# Patient Record
(demographics unavailable — no encounter records)

---

## 2025-04-07 NOTE — ASSESSMENT
[FreeTextEntry1] : GERD GI eval and manage symptoms of [employer] covered condition of   GERD COLO and EGD if indicated for polyps  [facility] covers colon cancer screening once every 10 years unless as a diagnostic  Patient c/o bloating, feeling nauseous after eating, hungry after eating  patient is aware that diverticulitis is not covered
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
## 2025-04-07 NOTE — HISTORY OF PRESENT ILLNESS
[FreeTextEntry1] :     HPI 62 y M presents to [facility] for his follow up  he is cert for Chronic GERD  GERD-  pt reports to have constant heartburn and regurgitation.  Patient c/o bloating, feeling nauseous after eating, hungry after eating  He was seen by his GI and placed on pantoprazole 40 mg twice a day that somewhat helped him but he still wakes up with clearing his throat and heartburn. He would like to be seen by GI  had colonoscopy and EGD 2019- no BE noted on EGD he had some polyps and is due back this year for COLO

## 2025-04-07 NOTE — REVIEW OF SYSTEMS
[Earache] : no earache [Loss Of Hearing] : no hearing loss [Chest Pain] : no chest pain [Palpitations] : no palpitations [Shortness Of Breath] : no shortness of breath [Cough] : no cough [Wheezing] : no wheezing [SOB on Exertion] : no shortness of breath during exertion [Abdominal Pain] : abdominal pain [Constipation] : constipation [Heartburn] : heartburn [Joint Pain] : no joint pain [Skin Lesions] : no skin lesions

## 2025-04-07 NOTE — HEALTH RISK ASSESSMENT
[Patient reported colonoscopy was normal] : Patient reported colonoscopy was normal [ColonoscopyDate] : 2019 [ColonoscopyComments] : polyp due back now

## 2025-04-07 NOTE — ASSESSMENT
[FreeTextEntry1] : GERD GI eval and manage symptoms of [facility] covered condition of   GERD COLO and EGD if indicated for polyps  [employer] covers colon cancer screening once every 10 years unless as a diagnostic  Patient c/o bloating, feeling nauseous after eating, hungry after eating  patient is aware that diverticulitis is not covered
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
## 2025-04-07 NOTE — END OF VISIT
[Time Spent: ___ minutes] : I have spent [unfilled] minutes of time on the encounter which excludes teaching and separately reported services. regular rate and rhythm/no murmur
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
## 2025-04-07 NOTE — HISTORY OF PRESENT ILLNESS
[FreeTextEntry1] :     HPI 62 y M presents to [facility] for his follow up  he is cert for Chronic GERD  GERD-  pt reports to have constant heartburn and regurgitation.  Patient c/o bloating, feeling nauseous after eating, hungry after eating  He was seen by his GI and placed on pantoprazole 40 mg twice a day that somewhat helped him but he still wakes up with clearing his throat and heartburn. He would like to be seen by GI  had colonoscopy and EGD 2019- no BE noted on EGD he had some polyps and is due back this year for COLO